# Patient Record
Sex: FEMALE | Race: WHITE | URBAN - METROPOLITAN AREA
[De-identification: names, ages, dates, MRNs, and addresses within clinical notes are randomized per-mention and may not be internally consistent; named-entity substitution may affect disease eponyms.]

---

## 2019-12-24 ENCOUNTER — HISTORICAL (OUTPATIENT)
Dept: ADMINISTRATIVE | Facility: HOSPITAL | Age: 24
End: 2019-12-24

## 2019-12-31 ENCOUNTER — HISTORICAL (OUTPATIENT)
Dept: ADMINISTRATIVE | Facility: HOSPITAL | Age: 24
End: 2019-12-31

## 2020-01-08 ENCOUNTER — HISTORICAL (OUTPATIENT)
Dept: ADMINISTRATIVE | Facility: HOSPITAL | Age: 25
End: 2020-01-08

## 2022-04-29 VITALS
HEIGHT: 60 IN | WEIGHT: 120.13 LBS | BODY MASS INDEX: 23.58 KG/M2 | BODY MASS INDEX: 23.58 KG/M2 | WEIGHT: 120.13 LBS | BODY MASS INDEX: 23.58 KG/M2 | WEIGHT: 120.13 LBS | HEIGHT: 60 IN | HEIGHT: 60 IN
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
